# Patient Record
(demographics unavailable — no encounter records)

---

## 2025-01-14 NOTE — HISTORY OF PRESENT ILLNESS
[FreeTextEntry1] : MARIANNA RODRIGUEZ is a 9 year old male with ADHD here for a follow up.  Marianna has been doing well. He is currently in a general education classroom with an IEP and receives OT. He is supposed to receive extra time for exams, however he is not receiving that. He continues to have a hard time staying focused. He was initially better, but now it is taking him a very long time to do his work because of his inattention. The teacher rushes him through things even though it takes him longer to complete. His focus is a challenge at home as well. He cannot stay on task for more than a couple of minutes without requiring redirection. Academically he continues to keep up, however he works very hard and requires a lot of support.    Initial Evaluation:  Educational assessment:  Current Grade: 2nd  [facility] [place]  Marianna is currently in a general education class with no services in place. Grandmother reports that he is always moving around, he is forgetful, and he cannot stay focused. She said the teachers have reported the same concerns since last year. The teachers have said that they constantly have to remind him to do things and complete tasks. Grandmother says that last year he was in a [employer] school and they said that the school "wasn't a right fit for him." This year he went to a different private school and is repeating 2nd grade. Academically he is doing much better this year than last year. Last year he got evaluated by the school but did not qualify for any services. Grandmother said that last year he was refusing to write and take tests. This year he has gotten better, but he mainly struggles with math.   Grandmother says the behaviors are the same both in school and at home. She says homework takes a long time and if he is not interested in something he needs a lot of "coaching" and redirection. Recently, grandmother got him evaluated outside of school for OT due to difficulty with writing and he is due to begin remote therapy after the new year. Socially, the teachers have reported that he was being bullied in the beginning of school year which has since improved and he now has friends. Grandmother says he plays well with other kids when outside of school as well. There are no issues falling asleep or staying asleep. No concern for anxiety, depression, OCD. Denies staring, twitching, seizure or seizure-like activity. No serious head injury, meningoencephalitis.

## 2025-01-14 NOTE — HISTORY OF PRESENT ILLNESS
[FreeTextEntry1] : MARIANNA RODRIGUEZ is a 9 year old male with ADHD here for a follow up.  Marianna has been doing well. He is currently in a general education classroom with an IEP and receives OT. He is supposed to receive extra time for exams, however he is not receiving that. He continues to have a hard time staying focused. He was initially better, but now it is taking him a very long time to do his work because of his inattention. The teacher rushes him through things even though it takes him longer to complete. His focus is a challenge at home as well. He cannot stay on task for more than a couple of minutes without requiring redirection. Academically he continues to keep up, however he works very hard and requires a lot of support.    Initial Evaluation:  Educational assessment:  Current Grade: 2nd  [facility] [place]  Marianna is currently in a general education class with no services in place. Grandmother reports that he is always moving around, he is forgetful, and he cannot stay focused. She said the teachers have reported the same concerns since last year. The teachers have said that they constantly have to remind him to do things and complete tasks. Grandmother says that last year he was in a [employer] school and they said that the school "wasn't a right fit for him." This year he went to a different private school and is repeating 2nd grade. Academically he is doing much better this year than last year. Last year he got evaluated by the school but did not qualify for any services. Grandmother said that last year he was refusing to write and take tests. This year he has gotten better, but he mainly struggles with math.   Grandmother says the behaviors are the same both in school and at home. She says homework takes a long time and if he is not interested in something he needs a lot of "coaching" and redirection. Recently, grandmother got him evaluated outside of school for OT due to difficulty with writing and he is due to begin remote therapy after the new year. Socially, the teachers have reported that he was being bullied in the beginning of school year which has since improved and he now has friends. Grandmother says he plays well with other kids when outside of school as well. There are no issues falling asleep or staying asleep. No concern for anxiety, depression, OCD. Denies staring, twitching, seizure or seizure-like activity. No serious head injury, meningoencephalitis.

## 2025-01-14 NOTE — DATA REVIEWED
[FreeTextEntry1] : [place] Forms Score\par  \par  Parent:\par  ADD - ()\par  Hyperactivity - ()\par  ODD  - ()\par  Conduct Disorder  (3/14)\par  Anxiety/depression- - (3/7) \par  Performance AV.5 borderline ADHD-inattentive type \par  \par  Teacher: \par  ADD - ()\par  Hyperactivity 3/9- ()\par  ODD/ Conduct Disorder  1/10 - (4/10)\par  Anxiety/depression- 3/7- (3/7) \par  Performance Avg 4 (+) ADHD- inattentive type \par

## 2025-01-14 NOTE — CONSULT LETTER
[Dear  ___] : Dear  [unfilled], [Consult Letter:] : I had the pleasure of evaluating your patient, [unfilled]. [Please see my note below.] : Please see my note below. [Consult Closing:] : Thank you very much for allowing me to participate in the care of this patient.  If you have any questions, please do not hesitate to contact me. [Sincerely,] : Sincerely, [FreeTextEntry3] : Lashaun Barbour, SILVA-BC Board Certified Family Nurse Practitioner Pediatric Neurology [facility] [address] [place] [address] Tel: [phone] Fax: [phone]

## 2025-01-14 NOTE — PLAN
[FreeTextEntry1] : - Metadate XR 10 mg  - Discussed use of medication as well as side effects - Follow up 1 month

## 2025-01-14 NOTE — REASON FOR VISIT
[Follow-Up Evaluation] : a follow-up evaluation for [ADHD] : ADHD [Other: ____] : [unfilled] [Patient] : patient [Family Member] : family member [Medical Records] : medical records [Other: _____] : [unfilled] [Mother] : mother

## 2025-01-14 NOTE — ASSESSMENT
[FreeTextEntry1] : GRAHAM is a 9 year old with ADHD here with mother and grandmother for a follow up. Currently in a general education classroom with an IEP and receives OT. Will start stimulant medication for ADHD.

## 2025-01-14 NOTE — DATA REVIEWED
[FreeTextEntry1] : [place] Forms Score\par  \par  Parent:\par  ADD - ()\par  Hyperactivity - ()\par  ODD  - ()\par  Conduct Disorder  (3/14)\par  Anxiety/depression- - (3/7) \par  Performance AV.5 borderline ADHD-inattentive type \par  \par  Teacher: \par  ADD - ()\par  Hyperactivity 3/9- ()\par  ODD/ Conduct Disorder  1/10 - (4/10)\par  Anxiety/depression- 3/7- (3/7) \par  Performance Avg 4 (+) ADHD- inattentive type \par

## 2025-01-14 NOTE — CONSULT LETTER
[Dear  ___] : Dear  [unfilled], [Consult Letter:] : I had the pleasure of evaluating your patient, [unfilled]. [Please see my note below.] : Please see my note below. [Consult Closing:] : Thank you very much for allowing me to participate in the care of this patient.  If you have any questions, please do not hesitate to contact me. [Sincerely,] : Sincerely, [FreeTextEntry3] : Lashaun Barbour, SILVA-BC Board Certified Family Nurse Practitioner Pediatric Neurology [facility] [address] [place] [address] Tel: [phone] Fax: [phone]

## 2025-02-10 NOTE — REASON FOR VISIT
[Home] : at home, [unfilled] , at the time of the visit. [Medical Office: ([facility])___] : at the medical office located in  [Telehealth (audio & video)] : This visit was provided via telehealth using real-time 2-way audio visual technology. [FreeTextEntry3] : mother [Follow-Up Evaluation] : a follow-up evaluation for [ADHD] : ADHD [Patient] : patient [Mother] : mother [Family Member] : family member [Medical Records] : medical records [Other: _____] : [unfilled]

## 2025-02-10 NOTE — HISTORY OF PRESENT ILLNESS
[FreeTextEntry1] : MARIANNA RODRIGUEZ is a 9 year old male with ADHD on Metadate XR 10 mg here for a follow up.     Initial Evaluation:  Educational assessment:  Current Grade: 2nd  Current [facility] in [place]  Marianna is currently in a general education class with no services in place. Grandmother reports that he is always moving around, he is forgetful, and he cannot stay focused. She said the teachers have reported the same concerns since last year. The teachers have said that they constantly have to remind him to do things and complete tasks. Grandmother says that last year he was in a [employer] school and they said that the school "wasn't a right fit for him." This year he went to a different private school and is repeating 2nd grade. Academically he is doing much better this year than last year. Last year he got evaluated by the school but did not qualify for any services. Grandmother said that last year he was refusing to write and take tests. This year he has gotten better, but he mainly struggles with math.   Grandmother says the behaviors are the same both in school and at home. She says homework takes a long time and if he is not interested in something he needs a lot of "coaching" and redirection. Recently, grandmother got him evaluated outside of school for OT due to difficulty with writing and he is due to begin remote therapy after the new year. Socially, the teachers have reported that he was being bullied in the beginning of school year which has since improved and he now has friends. Grandmother says he plays well with other kids when outside of school as well. There are no issues falling asleep or staying asleep. No concern for anxiety, depression, OCD. Denies staring, twitching, seizure or seizure-like activity. No serious head injury, meningoencephalitis.

## 2025-02-10 NOTE — ASSESSMENT
[FreeTextEntry1] : GRAHAM is a 9 year old with ADHD on Metadate XR here with mother and grandmother for a follow up. Currently in a general education classroom with an IEP and receives OT.  Reportedly doing well on current medication regimen. Denies any negative side effects. Will continue current dose of stimulant medication for ADHD.

## 2025-02-10 NOTE — REASON FOR VISIT
[Home] : at home, [unfilled] , at the time of the visit. [Medical Office: ([facility])___] : at the medical office located in  [Telehealth (audio & video)] : This visit was provided via telehealth using real-time 2-way audio visual technology. [FreeTextEntry3] : mother [Follow-Up Evaluation] : a follow-up evaluation for [ADHD] : ADHD [Patient] : patient [Mother] : mother [Family Member] : family member [Medical Records] : medical records [Other: _____] : [unfilled]

## 2025-02-10 NOTE — CONSULT LETTER
[Dear  ___] : Dear  [unfilled], [Consult Letter:] : I had the pleasure of evaluating your patient, [unfilled]. [Please see my note below.] : Please see my note below. [Consult Closing:] : Thank you very much for allowing me to participate in the care of this patient.  If you have any questions, please do not hesitate to contact me. [Sincerely,] : Sincerely, [FreeTextEntry3] : Lashaun Barbour, SILVA-BC Board Certified Family Nurse Practitioner Pediatric Neurology [facility] [address] [place] [address] Tel: [phone] Fax: [phone]

## 2025-02-10 NOTE — DATA REVIEWED
[FreeTextEntry1] : [place] Forms Score\par  \par  Parent:\par  ADD - ()\par  Hyperactivity - ()\par  ODD  - ()\par  Conduct Disorder  (3/14)\par  Anxiety/depression- - (3/7) \par  Performance AV.5 borderline ADHD-inattentive type \par  \par  Teacher: \par  ADD - ()\par  Hyperactivity 3/9- ()\par  ODD/ Conduct Disorder  1/10 - (4/10)\par  Anxiety/depression- 3/7- (3/7) \par  Performance Avg 4 (+) ADHD- inattentive type \par

## 2025-02-10 NOTE — DATA REVIEWED
Possible Home [FreeTextEntry1] : [place] Forms Score\par  \par  Parent:\par  ADD - ()\par  Hyperactivity - ()\par  ODD  - ()\par  Conduct Disorder  (3/14)\par  Anxiety/depression- - (3/7) \par  Performance AV.5 borderline ADHD-inattentive type \par  \par  Teacher: \par  ADD - ()\par  Hyperactivity 3/9- ()\par  ODD/ Conduct Disorder  1/10 - (4/10)\par  Anxiety/depression- 3/7- (3/7) \par  Performance Avg 4 (+) ADHD- inattentive type \par

## 2025-02-10 NOTE — HISTORY OF PRESENT ILLNESS
[FreeTextEntry1] : MARIANNA RODRIGUEZ is a 9 year old male with ADHD on Metadate XR 10 mg here for a follow up.     Initial Evaluation:  Educational assessment:  Current Grade: 2nd  Current District: [facility] in [place]  Marianna is currently in a general education class with no services in place. Grandmother reports that he is always moving around, he is forgetful, and he cannot stay focused. She said the teachers have reported the same concerns since last year. The teachers have said that they constantly have to remind him to do things and complete tasks. Grandmother says that last year he was in a [employer] school and they said that the school "wasn't a right fit for him." This year he went to a different private school and is repeating 2nd grade. Academically he is doing much better this year than last year. Last year he got evaluated by the school but did not qualify for any services. Grandmother said that last year he was refusing to write and take tests. This year he has gotten better, but he mainly struggles with math.   Grandmother says the behaviors are the same both in school and at home. She says homework takes a long time and if he is not interested in something he needs a lot of "coaching" and redirection. Recently, grandmother got him evaluated outside of school for OT due to difficulty with writing and he is due to begin remote therapy after the new year. Socially, the teachers have reported that he was being bullied in the beginning of school year which has since improved and he now has friends. Grandmother says he plays well with other kids when outside of school as well. There are no issues falling asleep or staying asleep. No concern for anxiety, depression, OCD. Denies staring, twitching, seizure or seizure-like activity. No serious head injury, meningoencephalitis.

## 2025-02-10 NOTE — CONSULT LETTER
[Dear  ___] : Dear  [unfilled], [Consult Letter:] : I had the pleasure of evaluating your patient, [unfilled]. [Please see my note below.] : Please see my note below. [Consult Closing:] : Thank you very much for allowing me to participate in the care of this patient.  If you have any questions, please do not hesitate to contact me. [Sincerely,] : Sincerely, [FreeTextEntry3] : Lashaun Barbour, SILVA-BC Board Certified Family Nurse Practitioner Pediatric Neurology [facility] [address] [place] [address] Tel: [phone] Fax: [phone]

## 2025-02-10 NOTE — PLAN
[FreeTextEntry1] : - Metadate XR 10 mg  - Discussed use of medication as well as side effects - Follow up